# Patient Record
Sex: MALE | Race: WHITE | NOT HISPANIC OR LATINO
[De-identification: names, ages, dates, MRNs, and addresses within clinical notes are randomized per-mention and may not be internally consistent; named-entity substitution may affect disease eponyms.]

---

## 2022-12-06 PROBLEM — Z00.00 ENCOUNTER FOR PREVENTIVE HEALTH EXAMINATION: Status: ACTIVE | Noted: 2022-12-06

## 2023-01-04 ENCOUNTER — APPOINTMENT (OUTPATIENT)
Dept: UROLOGY | Facility: CLINIC | Age: 43
End: 2023-01-04
Payer: OTHER GOVERNMENT

## 2023-01-04 VITALS
WEIGHT: 163 LBS | DIASTOLIC BLOOD PRESSURE: 71 MMHG | HEART RATE: 60 BPM | SYSTOLIC BLOOD PRESSURE: 116 MMHG | BODY MASS INDEX: 27.16 KG/M2 | HEIGHT: 65 IN

## 2023-01-04 PROCEDURE — 99204 OFFICE O/P NEW MOD 45 MIN: CPT

## 2023-01-04 NOTE — HISTORY OF PRESENT ILLNESS
[FreeTextEntry1] : This is a 42 year male who states has decreased energy levels\par States that has no stress, sleeps well\par works out 6 days a week\par eats clean\par no HTN, DM, HTN \par \par decreased sex drive \par good morning erection\par also trouble with firmness \par \par urinates frequently -- wakes 3x per night \par \par Drinks energy drinks as much as four, and tea with honey \par \par Also with decrease erection

## 2023-01-07 ENCOUNTER — LABORATORY RESULT (OUTPATIENT)
Age: 43
End: 2023-01-07

## 2023-01-11 ENCOUNTER — OUTPATIENT (OUTPATIENT)
Dept: OUTPATIENT SERVICES | Facility: HOSPITAL | Age: 43
LOS: 1 days | Discharge: HOME | End: 2023-01-11
Payer: OTHER GOVERNMENT

## 2023-01-11 ENCOUNTER — RESULT REVIEW (OUTPATIENT)
Age: 43
End: 2023-01-11

## 2023-01-11 DIAGNOSIS — R35.0 FREQUENCY OF MICTURITION: ICD-10-CM

## 2023-01-11 PROCEDURE — 76857 US EXAM PELVIC LIMITED: CPT | Mod: 26

## 2023-02-13 ENCOUNTER — APPOINTMENT (OUTPATIENT)
Dept: UROLOGY | Facility: CLINIC | Age: 43
End: 2023-02-13
Payer: OTHER GOVERNMENT

## 2023-02-13 DIAGNOSIS — R35.0 FREQUENCY OF MICTURITION: ICD-10-CM

## 2023-02-13 DIAGNOSIS — R68.82 DECREASED LIBIDO: ICD-10-CM

## 2023-02-13 LAB
APPEARANCE: CLEAR
BILIRUBIN URINE: NEGATIVE
BLOOD URINE: NEGATIVE
COLOR: NORMAL
GLUCOSE QUALITATIVE U: NEGATIVE
KETONES URINE: NEGATIVE
LEUKOCYTE ESTERASE URINE: NEGATIVE
NITRITE URINE: NEGATIVE
PH URINE: 6.5
PROTEIN URINE: NORMAL
SPECIFIC GRAVITY URINE: 1.02
TESTOST SERPL-MCNC: 391 NG/DL
UROBILINOGEN URINE: NORMAL

## 2023-02-13 PROCEDURE — 99214 OFFICE O/P EST MOD 30 MIN: CPT | Mod: 95

## 2023-02-13 NOTE — ASSESSMENT
[FreeTextEntry1] : \par This is a 42 year male who states has decreased energy levels\par States that has no stress, sleeps well\par works out 6 days a week\par eats clean\par no HTN, DM, HTN \par \par decreased sex drive \par good morning erection\par also trouble with firmness \par \par urinates frequently -- wakes 3x per night \par \par Drinks energy drinks as much as four, and tea with honey \par \par Also with decrease erection. \par \par Start: Sildenafil Citrate 20 MG Oral Tablet; happy with 4 pills - one hour erection without side effects\par \par Testosterone, Total Serum; \par \par Jan 2023\par Urinalysis;  neg blood, neg LE\par Testosterone - 388\par US Urinary Bladder; 22g prostate, pvr 90\par \par was told to decrease energy drinks and caffeine\par

## 2023-02-13 NOTE — HISTORY OF PRESENT ILLNESS
[Home] : at home, [unfilled] , at the time of the visit. [Medical Office: ([facility])___] : at the medical office located in  [Verbal consent obtained from patient] : the patient, [unfilled] [FreeTextEntry1] : TELEMEDICINE -- HPI FOR FOLLOW UP APPOINTMENT\par \par The patient-doctor relationship has been established in a face to face fashion via real time video/audio HIPAA compliant communication using telemedicine software. The patient's identity has been confirmed. The patient was previously emailed a copy of the telemedicine consent. They have had a chance to review and has now given verbal consent and has requested care to be assessed and treated through telemedicine and understands there maybe limitations in this process and they may need further follow up care in the office and or hospital settings.\par \par The patient denies fevers, chills, nausea and or vomiting and no unexplained weight loss.\par \par All pertinent parts of the patient PFSH (past medical, family and social histories), laboratory, radiological studies and physician notes were reviewed prior to starting the face to face portion of the telemedicine visit. Questionnaire results were discussed with patient.\par \par ==================================================================================================== \par \par This is a 42 year male who states has decreased energy levels\par States that has no stress, sleeps well\par works out 6 days a week\par eats clean\par no HTN, DM, HTN \par \par decreased sex drive \par good morning erection\par also trouble with firmness \par \par urinates frequently -- wakes 3x per night \par \par Drinks energy drinks as much as four, and tea with honey \par \par Also with decrease erection. \par \par Start: Sildenafil Citrate 20 MG Oral Tablet; happy with 4 pills - one hour erection without side effects\par \par Testosterone, Total Serum; \par \par Jan 2023\par Urinalysis;  neg blood, neg LE\par Testosterone - 388\par US Urinary Bladder; 22g prostate, pvr 90\par \par was told to decrease energy drinks and caffeine\par \par \par \par L/M\par [phone]

## 2023-02-24 ENCOUNTER — APPOINTMENT (OUTPATIENT)
Dept: UROLOGY | Facility: CLINIC | Age: 43
End: 2023-02-24

## 2023-07-31 ENCOUNTER — APPOINTMENT (OUTPATIENT)
Dept: UROLOGY | Facility: CLINIC | Age: 43
End: 2023-07-31

## 2023-07-31 NOTE — HISTORY OF PRESENT ILLNESS
[Home] : at home, [unfilled] , at the time of the visit. [Medical Office: ([facility])___] : at the medical office located in  [Verbal consent obtained from patient] : the patient, [unfilled] [FreeTextEntry1] : TELEMEDICINE -- \[facility] [place] [facility]\"" FOR FOLLOW UP APPOINTMENT  The patient-doctor relationship has been established in a face to face fashion via real time video/audio HIPAA compliant communication using telemedicine software. The patient's identity has been confirmed. The patient was previously emailed a copy of the telemedicine consent. They have had a chance to review and has now given verbal consent and has requested care to be assessed and treated through telemedicine and understands there maybe limitations in this process and they may need further follow up care in the office and or hospital settings.  The patient denies fevers, chills, nausea and or vomiting and no unexplained weight loss.  All pertinent parts of the patient PFSH (past medical, family and social histories), laboratory, radiological studies and physician notes were reviewed prior to starting the face to face portion of the telemedicine visit. Questionnaire results were discussed with patient.  ====================================================================================================   This is a 42 year male who states has decreased energy levels States that has no stress, sleeps well works out 6 days a week eats clean no HTN, DM, HTN    decreased sex drive good morning erection also trouble with firmness    urinates frequently -- wakes 3x per night Drinks energy drinks as much as four, and tea with honey Also with decrease erection.  Start: Sildenafil Citrate 20 MG Oral Tablet; happy with 4 pills - one hour erection without side effects  Jan 2023 Urinalysis; neg blood, neg LE Testosterone - 388 US Urinary Bladder; 22g prostate, pvr 90    was told to decrease energy drinks and caffeine .        Plan  testosterone levels normal but on the lower side of normal not interested in tamsulosin for now -- will continue to consider prostate only minimally enlarged happy with sildenafil states that was told has deviated septum -- may be causing his tiredness     [phone]

## 2023-12-14 ENCOUNTER — APPOINTMENT (OUTPATIENT)
Dept: SLEEP CENTER | Facility: HOSPITAL | Age: 43
End: 2023-12-14
Payer: OTHER GOVERNMENT

## 2023-12-14 ENCOUNTER — OUTPATIENT (OUTPATIENT)
Dept: OUTPATIENT SERVICES | Facility: HOSPITAL | Age: 43
LOS: 1 days | Discharge: HOME | End: 2023-12-14
Payer: OTHER GOVERNMENT

## 2023-12-14 PROCEDURE — 95810 POLYSOM 6/> YRS 4/> PARAM: CPT

## 2023-12-14 PROCEDURE — 95810 POLYSOM 6/> YRS 4/> PARAM: CPT | Mod: 26

## 2023-12-15 DIAGNOSIS — G47.33 OBSTRUCTIVE SLEEP APNEA (ADULT) (PEDIATRIC): ICD-10-CM

## 2023-12-16 DIAGNOSIS — G47.33 OBSTRUCTIVE SLEEP APNEA (ADULT) (PEDIATRIC): ICD-10-CM

## 2024-01-05 ENCOUNTER — APPOINTMENT (OUTPATIENT)
Dept: UROLOGY | Facility: CLINIC | Age: 44
End: 2024-01-05
Payer: OTHER GOVERNMENT

## 2024-01-05 DIAGNOSIS — R35.1 NOCTURIA: ICD-10-CM

## 2024-01-05 DIAGNOSIS — N52.01 ERECTILE DYSFUNCTION DUE TO ARTERIAL INSUFFICIENCY: ICD-10-CM

## 2024-01-05 DIAGNOSIS — R53.83 OTHER FATIGUE: ICD-10-CM

## 2024-01-05 PROCEDURE — 99213 OFFICE O/P EST LOW 20 MIN: CPT

## 2024-01-05 RX ORDER — SILDENAFIL 20 MG/1
20 TABLET ORAL
Qty: 20 | Refills: 11 | Status: COMPLETED | COMMUNITY
Start: 2023-01-04 | End: 2024-01-05

## 2024-01-05 RX ORDER — SILDENAFIL 100 MG/1
100 TABLET, FILM COATED ORAL
Qty: 30 | Refills: 11 | Status: ACTIVE | COMMUNITY
Start: 2024-01-05 | End: 1900-01-01

## 2024-01-05 NOTE — HISTORY OF PRESENT ILLNESS
[FreeTextEntry1] : Patient is a 43-year-old with decreased energy and erectile dysfunction.  Currently being worked up for sleep apnea.  Total testosterone levels at 390 1 January 2023.  He states that upon repeat, his testosterone increased to the mid 400 range.  He is currently taking sildenafil 80 mg as needed for erectile dysfunction with good efficacy.  He denies any side effects.  Urinating with good flow.  Symptoms stable.  Nocturia x 3.  Denies dysuria and gross hematuria.

## 2024-01-05 NOTE — ASSESSMENT
[FreeTextEntry1] : 43-year-old with low energy, and erectile dysfunction. Currently being worked up for sleep apnea.  Taking sildenafil 80 mg as needed for ED.  Request 100 mg tablets. Denies any side effects. Sildenafil 100 mg tablet sent to pharmacy.  He will continue follow-up with medical doctor. Follow-up 2 years.  Can renew medication in between. Follow-up sooner as needed.